# Patient Record
Sex: MALE | Race: OTHER | NOT HISPANIC OR LATINO
[De-identification: names, ages, dates, MRNs, and addresses within clinical notes are randomized per-mention and may not be internally consistent; named-entity substitution may affect disease eponyms.]

---

## 2019-06-19 PROBLEM — Z00.00 ENCOUNTER FOR PREVENTIVE HEALTH EXAMINATION: Status: ACTIVE | Noted: 2019-06-19

## 2019-06-24 ENCOUNTER — APPOINTMENT (OUTPATIENT)
Dept: ORTHOPEDIC SURGERY | Facility: CLINIC | Age: 39
End: 2019-06-24
Payer: COMMERCIAL

## 2019-06-24 ENCOUNTER — APPOINTMENT (OUTPATIENT)
Dept: ORTHOPEDIC SURGERY | Facility: CLINIC | Age: 39
End: 2019-06-24

## 2019-06-24 VITALS — WEIGHT: 150 LBS | BODY MASS INDEX: 22.73 KG/M2 | HEIGHT: 68 IN

## 2019-06-24 DIAGNOSIS — M23.92 UNSPECIFIED INTERNAL DERANGEMENT OF LEFT KNEE: ICD-10-CM

## 2019-06-24 DIAGNOSIS — M23.91 UNSPECIFIED INTERNAL DERANGEMENT OF RIGHT KNEE: ICD-10-CM

## 2019-06-24 PROCEDURE — 99204 OFFICE O/P NEW MOD 45 MIN: CPT

## 2019-06-24 PROCEDURE — 73562 X-RAY EXAM OF KNEE 3: CPT | Mod: 50

## 2019-06-24 NOTE — DISCUSSION/SUMMARY
[de-identified] : This patient will begin physical therapy. He can get MRI of his lumbar spine and bring it for me to review it. After the physical therapy

## 2019-06-24 NOTE — REVIEW OF SYSTEMS
[Arthralgia] : arthralgia [Joint Pain] : joint pain [Negative] : Endocrine [Joint Stiffness] : no joint stiffness [Joint Swelling] : no joint swelling

## 2019-06-24 NOTE — HISTORY OF PRESENT ILLNESS
[de-identified] : Location: bilateral knee\par Quality: burning pain level 3/10\par Duration:10/2018\par Context: atraumatic\par Aggravating Factors: walking, exercise\par Conservative treatment: Rest, Ice\par Associated Symptoms: stiffness, warmth, weakness, clicking popping \par Prior Studies:n.a\par

## 2019-06-24 NOTE — PHYSICAL EXAM
[de-identified] : Bilateral knee exam shows no warmth or swelling full range of motion. He does have marked external rotation of his hip syndrome is no internal rotation. There is no pain on rotation of his hips. Neurovascular exam is normal negative Timothy negative instability [de-identified] : Bilateral knee x-rays show mild patellar tilt

## 2019-12-14 ENCOUNTER — EMERGENCY (EMERGENCY)
Facility: HOSPITAL | Age: 39
LOS: 1 days | Discharge: HOME | End: 2019-12-14
Admitting: EMERGENCY MEDICINE
Payer: COMMERCIAL

## 2019-12-14 VITALS
DIASTOLIC BLOOD PRESSURE: 83 MMHG | WEIGHT: 160.06 LBS | HEART RATE: 88 BPM | OXYGEN SATURATION: 99 % | TEMPERATURE: 98 F | RESPIRATION RATE: 18 BRPM | SYSTOLIC BLOOD PRESSURE: 120 MMHG | HEIGHT: 68 IN

## 2019-12-14 PROCEDURE — 99282 EMERGENCY DEPT VISIT SF MDM: CPT

## 2019-12-14 NOTE — ED PROVIDER NOTE - OBJECTIVE STATEMENT
39y with a history of back injury from sports, lumbar spine injury, and bulged disc, presents to the ED with complains of mid back pain. Patient reports lifting weights in the gym 2 days ago and hurt his back. Patient endorses pain with movement, and taking Advil today for the pain. Denies numbness, or tingling.

## 2019-12-14 NOTE — ED ADULT NURSE NOTE - CHPI ED NUR SYMPTOMS NEG
no bowel dysfunction/no constipation/no difficulty bearing weight/no bladder dysfunction/no motor function loss/no neck tenderness/no anorexia/no fatigue/no tingling

## 2019-12-14 NOTE — ED PROVIDER NOTE - PATIENT PORTAL LINK FT
You can access the FollowMyHealth Patient Portal offered by [facility] by registering at the following website: [email or web address]. By joining [employer]’s FollowMyHealth portal, you will also be able to view your health information using other applications (apps) compatible with our system.

## 2019-12-14 NOTE — ED PROVIDER NOTE - CARE PROVIDER_API CALL
Val Colin)  Orthopaedic Surgery  [address] [place] [address]  Phone: [phone]  Fax: [phone]  Follow Up Time:     Jean-Paul Ram)  Orthopaedic Surgery  [address] [place] [address]  Phone: [phone]  Fax: [phone]  Follow Up Time:     Aguilar Gutierrez)  Orthopaedic Surgery  [address] [place] [address]  Phone: [phone]  Fax: [phone]  Follow Up Time:

## 2019-12-14 NOTE — ED PROVIDER NOTE - MUSCULOSKELETAL, MLM
Spine appears normal, range of motion is not limited, no muscle or joint tenderness; no midline paraspinal tenderness.

## 2019-12-14 NOTE — ED PROVIDER NOTE - CLINICAL SUMMARY MEDICAL DECISION MAKING FREE TEXT BOX
39y M presents to the ED with worsening low back pain x2 days. No neuro deficits, patient is comfortable and ambulatory. Offered pain medication which he declined. Patient wanting MRI, discussed we will not be able to get in the ED today, will have to be done as an outpatient. Patient to follow up with primary care or orthopedic to facilitate MRI, and to continue ibuprofen for pain.

## 2019-12-14 NOTE — ED ADULT TRIAGE NOTE - CHIEF COMPLAINT QUOTE
38 y/o male who came in to ED for evaluation of back pain, hx of herniated disk, denies bowel or urine incontinence.

## 2019-12-14 NOTE — ED PROVIDER NOTE - PROVIDER TOKENS
PROVIDER:[TOKEN:[[id number]]],PROVIDER:[TOKEN:[[id number]]],PROVIDER:[TOKEN:[[id number]]]

## 2019-12-14 NOTE — ED ADULT NURSE NOTE - NSIMPLEMENTINTERV_GEN_ALL_ED
Implemented All Universal Safety Interventions:  [place] to call system. Call bell, personal items and telephone within reach. Instruct patient to call for assistance. Room bathroom lighting operational. Non-slip footwear when patient is off stretcher. Physically safe environment: no spills, clutter or unnecessary equipment. Stretcher in lowest position, wheels locked, appropriate side rails in place.

## 2019-12-14 NOTE — ED ADULT NURSE NOTE - CHIEF COMPLAINT QUOTE
40 y/o male who came in to ED for evaluation of back pain, hx of herniated disk, denies bowel or urine incontinence.

## 2019-12-14 NOTE — ED ADULT NURSE NOTE - OBJECTIVE STATEMENT
Patient c/o of 2 days back pain, getting worse, no numbness/tingling sensation, states pain noted w/ movement.  PMHx. Herniated Disc.  States Advil and heat packs w/ mild relief.  No bladder/bowel dysfunciton.

## 2019-12-18 DIAGNOSIS — M54.5 LOW BACK PAIN: ICD-10-CM

## 2019-12-18 DIAGNOSIS — M54.9 DORSALGIA, UNSPECIFIED: ICD-10-CM

## 2020-01-08 PROBLEM — Z78.9 OTHER SPECIFIED HEALTH STATUS: Chronic | Status: ACTIVE | Noted: 2019-12-14

## 2020-01-14 ENCOUNTER — APPOINTMENT (OUTPATIENT)
Dept: ORTHOPEDIC SURGERY | Facility: CLINIC | Age: 40
End: 2020-01-14
Payer: COMMERCIAL

## 2020-01-14 DIAGNOSIS — M54.5 LOW BACK PAIN: ICD-10-CM

## 2020-01-14 DIAGNOSIS — M54.9 DORSALGIA, UNSPECIFIED: ICD-10-CM

## 2020-01-14 DIAGNOSIS — Z78.9 OTHER SPECIFIED HEALTH STATUS: ICD-10-CM

## 2020-01-14 PROCEDURE — 72070 X-RAY EXAM THORAC SPINE 2VWS: CPT

## 2020-01-14 PROCEDURE — 72100 X-RAY EXAM L-S SPINE 2/3 VWS: CPT

## 2020-01-14 PROCEDURE — 99214 OFFICE O/P EST MOD 30 MIN: CPT

## 2020-01-16 PROBLEM — Z78.9 NO PERTINENT PAST MEDICAL HISTORY: Status: RESOLVED | Noted: 2020-01-16 | Resolved: 2020-01-16

## 2020-01-16 NOTE — HISTORY OF PRESENT ILLNESS
[de-identified] : 39 year old male with mid and low back pain.  He said he had a massage 1 month ago and started having the pain.  It has made it hard to go to the gym.  He took Advil which helps.  He denies any radicular pain, numbness, weakness, balance problems or urinary retention.

## 2020-01-16 NOTE — ASSESSMENT
[FreeTextEntry1] : 39 year old male with low back for 1 month.  He has no radicular symptoms and is neurologically intact.  He was given a referral for physical therapy.  He was given a referral for massage therapy.  He can take anti-inflammatories as needed for pain.  He will call to make a followup appointment. He knows to call with any questions or concerns or if his symptoms acutely worsen.

## 2020-01-16 NOTE — PHYSICAL EXAM
[de-identified] : General: No acute distress, conversant, well-nourished.\par Head: Normocephalic, atraumatic\par Neck: trachea midline, FROM\par Heart: normotensive and normal rate and rhythm\par Lungs: No labored breathing\par Skin: No abrasions, no rashes, no edema\par Psych: Alert and oriented to person, place and time\par Extremities: no peripheral edema or digital cyanosis\par Gait: Normal gait. Can perform tandem gait.  \par Vascular: warm and well perfused distally, palpable distal pulses\par \par MSK:\par Thoracic and Lumbar spine:\par Alignment normal.\par No tenderness to palpation.  No step-off, no deformity.\par \par NEURO EXAM:\par Sensation \par Left L2  -  2/2            \par Left L3  -  2/2\par Left L4  -  2/2\par Left L5  -  2/2\par Left S1  -  2/2\par \par Right L2  -  2/2            \par Right L3  -  2/2\par Right L4  -  2/2\par Right L5  -  2/2\par Right S1  -  2/2\par \par Motor: \par Left L2 (hip flexion)                            5/5                \par Left L3 (knee extension)                   5/5                \par Left L4 (ankle dorsiflexion)                 5/5                \par Left L5 (long toe extensor)                5/5                \par Left S1 (ankle plantar flexion)           5/5\par \par Right L2 (hip flexion)                            5/5                \par Right L3 (knee extension)                   5/5                \par Right L4 (ankle dorsiflexion)                 5/5                \par Right L5 (long toe extensor)                5/5                \par Right S1 (ankle plantar flexion)           5/5\par \par Reflexes: Normal and symmetric\par Negative clonus.  Down-going Babinski.  \par \par  [de-identified] : Thoracic spine radiographs obtained in the office today shows no fracture or dislocation.  \par \par Lumbar spine radiographs obtained in the office today shows no fracture or dislocation.
